# Patient Record
Sex: MALE | Race: WHITE | NOT HISPANIC OR LATINO
[De-identification: names, ages, dates, MRNs, and addresses within clinical notes are randomized per-mention and may not be internally consistent; named-entity substitution may affect disease eponyms.]

---

## 2018-02-26 PROBLEM — Z00.00 ENCOUNTER FOR PREVENTIVE HEALTH EXAMINATION: Status: ACTIVE | Noted: 2018-02-26

## 2018-03-09 DIAGNOSIS — M25.562 PAIN IN LEFT KNEE: ICD-10-CM

## 2018-03-23 ENCOUNTER — APPOINTMENT (OUTPATIENT)
Dept: ORTHOPEDIC SURGERY | Facility: CLINIC | Age: 68
End: 2018-03-23

## 2023-11-11 ENCOUNTER — APPOINTMENT (OUTPATIENT)
Dept: ORTHOPEDIC SURGERY | Facility: CLINIC | Age: 73
End: 2023-11-11
Payer: MEDICARE

## 2023-11-11 VITALS — BODY MASS INDEX: 32.89 KG/M2 | HEIGHT: 67.5 IN | WEIGHT: 212 LBS

## 2023-11-11 DIAGNOSIS — Z78.9 OTHER SPECIFIED HEALTH STATUS: ICD-10-CM

## 2023-11-11 DIAGNOSIS — J45.909 UNSPECIFIED ASTHMA, UNCOMPLICATED: ICD-10-CM

## 2023-11-11 PROCEDURE — 72040 X-RAY EXAM NECK SPINE 2-3 VW: CPT

## 2023-11-11 PROCEDURE — 99204 OFFICE O/P NEW MOD 45 MIN: CPT

## 2023-11-11 PROCEDURE — 73030 X-RAY EXAM OF SHOULDER: CPT | Mod: LT

## 2023-11-11 PROCEDURE — 73010 X-RAY EXAM OF SHOULDER BLADE: CPT | Mod: LT

## 2023-11-11 RX ORDER — METHYLPREDNISOLONE 4 MG/1
4 TABLET ORAL
Qty: 1 | Refills: 0 | Status: ACTIVE | COMMUNITY
Start: 2023-11-11 | End: 1900-01-01

## 2023-12-22 ENCOUNTER — APPOINTMENT (OUTPATIENT)
Dept: ORTHOPEDIC SURGERY | Facility: CLINIC | Age: 73
End: 2023-12-22
Payer: MEDICARE

## 2023-12-22 VITALS — WEIGHT: 212 LBS | HEIGHT: 67.5 IN | BODY MASS INDEX: 32.89 KG/M2

## 2023-12-22 PROCEDURE — 99214 OFFICE O/P EST MOD 30 MIN: CPT

## 2023-12-22 RX ORDER — CYCLOBENZAPRINE HYDROCHLORIDE 5 MG/1
5 TABLET, FILM COATED ORAL
Qty: 30 | Refills: 0 | Status: ACTIVE | COMMUNITY
Start: 2023-12-22 | End: 1900-01-01

## 2023-12-22 NOTE — REASON FOR VISIT
[FreeTextEntry2] : follow up left shoulder/neck. pain had improved with MDP and PT. Still notes persistent pain radiating down his arm.

## 2023-12-22 NOTE — IMAGING
[No bony abnormalities] : No bony abnormalities [Left] : left shoulder [There are no fractures, subluxations or dislocations. No significant abnormalities are seen] : There are no fractures, subluxations or dislocations. No significant abnormalities are seen [de-identified] :   ----------------------------------------------------------------------------   Cervical spine exam:   Inspection:        (neg) Abnormal alignment (kyphosis/lordosis)   (neg) Atrophy ROM:    Pain:               (+) Flexion/extension    (neg) Rotation    Stiffness:        (+) Flexion/extension    (neg) Rotation Tenderness:    Trapezial:       (neg) Right    (+mild) Left    (neg) Midline    Paraspinal:     (neg) Right    (neg) Left    Rhomboid :     (neg) Right    (neg) Left    Scapula:         (neg) Right   (neg) Left Strength:    Deltoid:          Right: 5/5   .  Left: 5/5    Biceps:          Right: 5/5   .  Left: 5/5    Triceps:         Right: 5/5   .  Left: 5/5    Wrist flex      Right: 5/5   .   Left: 5/5    Wrist ext:      Right: 5/5   .   Left: 5/5    Hand:            Right: 5/5   .   Left: 5/5 Neuro: DTR's wnl.  Sensation to light touch grossly in tact in all distributions.    (neg) Doug's    (neg) Spurling    (neg) Lhermitte Vascularity: Extremity warm and well perfused Gait: normal     ----------------------------------------------------------------------------   Left shoulder exam:    Skin: no significant pertinent finding  Inspection: no obvious deformity, no obvious masses, no swelling, no effusion, no atrophy  ROM:     FF: 180     ER: 70     IR: T12  Tenderness:     (neg) Anterior/Biceps:     (neg) Posterior     (neg) Lateral     (neg) Trapezius     (neg) Scapula     (neg) AC joint     (neg) Crepitus with ROM  Stability:     (neg) Translation     (neg) Apprehension     (neg) Clicking  Additional tests:     (mild) Neer's     (neg)Hawkin's     (neg) Wilson's     (neg) Speed     (neg) Cross chest adduction  Strength:     FF: 5/5     ER: 5/5     IR: 5/5     Biceps: 5/5     Triceps: 5/5     Distal: 5/5  Neuro: In tact to light touch throughout  Vascularity: Extremity warm and well perfused

## 2023-12-22 NOTE — HISTORY OF PRESENT ILLNESS
[Left Arm] : left arm [6] : 6 [Dull/Aching] : dull/aching [Tingling] : tingling [Nothing helps with pain getting better] : Nothing helps with pain getting better [de-identified] : This is Sandro PERCY RUFFIN a 73-year-old male who comes in today complaining of pain and tingling radiating down the arm, no injury.   on xarelto for afib [] : no [FreeTextEntry1] : Left shoulder [FreeTextEntry3] : 2 months ago  [de-identified] : activity  [de-identified] : physical therapy

## 2023-12-22 NOTE — DISCUSSION/SUMMARY
[Medication Risks Reviewed] : Medication risks reviewed [de-identified] : MRI to mir iglesias hnp flexeril patient is on xarelto fu p mri  ----------------------------------------------------------------------------  All relevant imaging studies pertinent to today's visit, including x-rays, MRI's and/or other advanced imaging studies (CT/etc) were independently interpreted and reviewed with the patient as needed. Implications of the studies together with the patient's clinical picture were discussed to formulate a working diagnosis and management options were detailed.  The patient was advised of the diagnosis.  The natural history of the pathology was explained in full. All questions were answered.  The risks and benefits of conservative and interventional treatment alternatives were explained to the patient   ----------------------------------------------------------------------------  The patient was advised that an advanced diagnostic/imaging study (MRI/CT/etc) will be ordered to evaluate potential pathology in the affected area(s).  The patient was further advised to follow up in the office to review the results of the study and determine further management that may be indicated.

## 2023-12-23 ENCOUNTER — APPOINTMENT (OUTPATIENT)
Dept: MRI IMAGING | Facility: CLINIC | Age: 73
End: 2023-12-23

## 2024-01-12 ENCOUNTER — APPOINTMENT (OUTPATIENT)
Dept: ORTHOPEDIC SURGERY | Facility: CLINIC | Age: 74
End: 2024-01-12
Payer: MEDICARE

## 2024-01-12 PROCEDURE — 99214 OFFICE O/P EST MOD 30 MIN: CPT

## 2024-01-12 RX ORDER — METHYLPREDNISOLONE 4 MG/1
4 TABLET ORAL
Qty: 1 | Refills: 0 | Status: ACTIVE | COMMUNITY
Start: 2024-01-12 | End: 1900-01-01

## 2024-01-12 NOTE — DATA REVIEWED
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

## 2024-01-12 NOTE — CONSULT LETTER
[Dear  ___] : Dear  [unfilled], [Consult Letter:] : I had the pleasure of evaluating your patient, [unfilled]. [Please see my note below.] : Please see my note below. [Consult Closing:] : Thank you very much for allowing me to participate in the care of this patient.  If you have any questions, please do not hesitate to contact me. [Sincerely,] : Sincerely, [FreeTextEntry3] : Harry Hickey

## 2024-01-12 NOTE — DISCUSSION/SUMMARY
Form signed and given to Fredy   [Medication Risks Reviewed] : Medication risks reviewed [de-identified] : Reviewed  c-spine MRI - discussed significant left c5-6 hnp consistent with his symptoms Plan for PT  Plan for MDP   Rec f/u with pain managment  adviesed fu with pmd regarding nodule of thyroid  ----------------------------------------------------------------------------  All relevant imaging studies pertinent to today's visit, including x-rays, MRI's and/or other advanced imaging studies (CT/etc) were independently interpreted and reviewed with the patient as needed. Implications of the studies together with the patient's clinical picture were discussed to formulate a working diagnosis and management options were detailed.  The patient was advised of the diagnosis.  The natural history of the pathology was explained in full. All questions were answered.  The risks and benefits of conservative and interventional treatment alternatives were explained to the patient   ----------------------------------------------------------------------------  Patient warned of specific risks of medication related to bleeding, GI issues, increase blood pressure, and cardiac risks in addition to additional risks.  Patient advised to discuss with PMD  if any presence of stated issues.

## 2024-01-12 NOTE — HISTORY OF PRESENT ILLNESS
[Left Arm] : left arm [6] : 6 [Dull/Aching] : dull/aching [Tingling] : tingling [Nothing helps with pain getting better] : Nothing helps with pain getting better [de-identified] : This is Sandro PERCY RUFFIN a 73-year-old male who comes in today complaining of pain and tingling radiating down the arm, no injury.   on xarelto for afib [] : no [FreeTextEntry1] : Left shoulder [FreeTextEntry3] : 2 months ago  [de-identified] : activity  [de-identified] : physical therapy/MRI

## 2024-01-12 NOTE — IMAGING
[No bony abnormalities] : No bony abnormalities [Left] : left shoulder [There are no fractures, subluxations or dislocations. No significant abnormalities are seen] : There are no fractures, subluxations or dislocations. No significant abnormalities are seen [de-identified] :   ----------------------------------------------------------------------------   Cervical spine exam:   Inspection:        (neg) Abnormal alignment (kyphosis/lordosis)   (neg) Atrophy ROM:    Pain:               (+) Flexion/extension    (neg) Rotation    Stiffness:        (+) Flexion/extension    (neg) Rotation Tenderness:    Trapezial:       (neg) Right    (+mild) Left    (neg) Midline    Paraspinal:     (neg) Right    (neg) Left    Rhomboid :     (neg) Right    (neg) Left    Scapula:         (neg) Right   (neg) Left Strength:    Deltoid:          Right: 5/5   .  Left: 5/5    Biceps:          Right: 5/5   .  Left: 5/5    Triceps:         Right: 5/5   .  Left: 5/5    Wrist flex      Right: 5/5   .   Left: 5/5    Wrist ext:      Right: 5/5   .   Left: 5/5    Hand:            Right: 5/5   .   Left: 5/5 Neuro: DTR's wnl.  Sensation to light touch grossly in tact in all distributions.    (neg) Doug's    (neg) Spurling    (neg) Lhermitte Vascularity: Extremity warm and well perfused Gait: normal     ----------------------------------------------------------------------------   Left shoulder exam:    Skin: no significant pertinent finding  Inspection: no obvious deformity, no obvious masses, no swelling, no effusion, no atrophy  ROM:     FF: 180     ER: 70     IR: T12  Tenderness:     (neg) Anterior/Biceps:     (neg) Posterior     (neg) Lateral     (neg) Trapezius     (neg) Scapula     (neg) AC joint     (neg) Crepitus with ROM  Stability:     (neg) Translation     (neg) Apprehension     (neg) Clicking  Additional tests:     (mild) Neer's     (neg)Hawkin's     (neg) Wilson's     (neg) Speed     (neg) Cross chest adduction  Strength:     FF: 5/5     ER: 5/5     IR: 5/5     Biceps: 5/5     Triceps: 5/5     Distal: 5/5  Neuro: In tact to light touch throughout  Vascularity: Extremity warm and well perfused

## 2024-01-19 ENCOUNTER — APPOINTMENT (OUTPATIENT)
Dept: PAIN MANAGEMENT | Facility: CLINIC | Age: 74
End: 2024-01-19
Payer: MEDICARE

## 2024-01-19 VITALS — WEIGHT: 211 LBS | HEIGHT: 67 IN | BODY MASS INDEX: 33.12 KG/M2

## 2024-01-19 PROCEDURE — 99214 OFFICE O/P EST MOD 30 MIN: CPT

## 2024-01-19 NOTE — HISTORY OF PRESENT ILLNESS
[Neck] : neck [7] : 7 [4] : 4 [Dull/Aching] : dull/aching [Radiating] : radiating [Sharp] : sharp [Intermittent] : intermittent [Household chores] : household chores [Leisure] : leisure [Sleep] : sleep [Meds] : meds [Bending forward] : bending forward [FreeTextEntry1] : Initial HPI 01/19/2024: Pain started October 2023 and is on the left side of the neck and radiates to the left shoulder and down the left arm to the elbow described as an achy pain with occasional tingling in the forearm and some pain in the thumb,   MRI Cervical Spine 1/6/24 independently reviewed: C3-4, C5-6, C5-6 HNPs with C4-5, C5-6 cord abutment. C5-6 left C6 abutment  aware of thyroid nodule.  Conservative Care: has started PT but had to stop bc of Covid  Pain Medications: Tylenol PRN; completed MDP x2 with temporary relief Past Injections: none Spine surgery: none  Blood thinners: XARELTO  [] : no [FreeTextEntry7] : LT SHOULDER [de-identified] : MRI

## 2024-01-19 NOTE — REASON FOR VISIT
[Initial Consultation] : an initial pain management consultation [FreeTextEntry2] :  referred pt for neck pain

## 2024-01-19 NOTE — DISCUSSION/SUMMARY
[de-identified] : After discussing various treatment options with the patient including but not limited to oral medications, physical therapy, exercise modalities as well as interventional spinal injections, we have decided with the following plan:  - Continue Home exercises, stretching, activity modification, physical therapy, and conservative care. - MRI report and/or images was reviewed and discussed with the patient. - Recommend C7-T1 Cervical Epidural Steroid Injection under fluoroscopic guidance with image. - The risks, benefits and alternatives of the proposed procedure were explained in detail with the patient. The risks outlined include but are not limited to infection, bleeding, post-dural puncture headache, nerve injury, a temporary increase in pain, failure to resolve symptoms, allergic reaction, symptom recurrence, and possible elevation of blood sugar in diabetics. All questions were answered to patient's apparent satisfaction and he/she verbalized an understanding. - Patient is presenting with acute/sub-acute radicular pain with impairment in ADLs and functionality.  The pain has not responded to conservative care including NSAID therapy and/or physical therapy.  There is no bleeding tendency, unstable medical condition, or systemic infection. - Follow up in 1-2 weeks post injection for re-evaluation. - Patient is on anticoagulation therapy and needs medical clearance to stop medication for the procedure. (XARELTO)

## 2024-01-19 NOTE — PHYSICAL EXAM
[de-identified] : Constitutional; Appears well, no apparent distress Ability to communicate: Normal  Respiratory: non-labored breathing Skin: No rash noted Head: Normocephalic, atraumatic Neck: no visible thyroid enlargement Eyes: Extraocular movements intact Neurologic: Alert and oriented x3 Psychiatric: normal mood, affect and behavior [] : negative Spurling

## 2024-02-14 ENCOUNTER — APPOINTMENT (OUTPATIENT)
Age: 74
End: 2024-02-14
Payer: MEDICARE

## 2024-02-14 PROCEDURE — 62321 NJX INTERLAMINAR CRV/THRC: CPT

## 2024-03-01 ENCOUNTER — APPOINTMENT (OUTPATIENT)
Dept: PAIN MANAGEMENT | Facility: CLINIC | Age: 74
End: 2024-03-01
Payer: MEDICARE

## 2024-03-01 VITALS — HEIGHT: 67 IN | WEIGHT: 220 LBS | BODY MASS INDEX: 34.53 KG/M2

## 2024-03-01 PROCEDURE — 99214 OFFICE O/P EST MOD 30 MIN: CPT

## 2024-03-01 NOTE — DISCUSSION/SUMMARY
[de-identified] : After discussing various treatment options with the patient including but not limited to oral medications, physical therapy, exercise modalities as well as interventional spinal injections, we have decided with the following plan:  - Continue Home exercises, stretching, activity modification, physical therapy, and conservative care. - MRI report and/or images was reviewed and discussed with the patient. - Recommend C7-T1 Cervical Epidural Steroid Injection under fluoroscopic guidance with image. - The risks, benefits and alternatives of the proposed procedure were explained in detail with the patient. The risks outlined include but are not limited to infection, bleeding, post-dural puncture headache, nerve injury, a temporary increase in pain, failure to resolve symptoms, allergic reaction, symptom recurrence, and possible elevation of blood sugar in diabetics. All questions were answered to patient's apparent satisfaction and he/she verbalized an understanding. - Patient is presenting with acute/sub-acute radicular pain with impairment in ADLs and functionality.  The pain has not responded to conservative care including NSAID therapy and/or physical therapy.  There is no bleeding tendency, unstable medical condition, or systemic infection. - Follow up in 1-2 weeks post injection for re-evaluation. - Patient is on anticoagulation therapy and needs medical clearance to stop medication for the procedure. (XARELTO)   - Recommend Gabapentin 300mg QHS. Pt instructed not to drive or operate heavy machinery while on medications.

## 2024-03-01 NOTE — PHYSICAL EXAM
[de-identified] : Constitutional; Appears well, no apparent distress Ability to communicate: Normal  Respiratory: non-labored breathing Skin: No rash noted Head: Normocephalic, atraumatic Neck: no visible thyroid enlargement Eyes: Extraocular movements intact Neurologic: Alert and oriented x3 Psychiatric: normal mood, affect and behavior [] : negative Spurling

## 2024-03-01 NOTE — HISTORY OF PRESENT ILLNESS
[Neck] : neck [7] : 7 [4] : 4 [Dull/Aching] : dull/aching [Radiating] : radiating [Sharp] : sharp [Intermittent] : intermittent [Household chores] : household chores [Leisure] : leisure [Sleep] : sleep [Meds] : meds [Bending forward] : bending forward [FreeTextEntry1] : 03/01/2024: s/p C7-T1 DB on with 50% relief and improvement of ADLs. Pain was great for a few days and then started to return. Continues to do PT.   Initial HPI 01/19/2024: Pain started October 2023 and is on the left side of the neck and radiates to the left shoulder and down the left arm to the elbow described as an achy pain with occasional tingling in the forearm and some pain in the thumb,   MRI Cervical Spine 1/6/24 independently reviewed: C3-4, C5-6, C5-6 HNPs with C4-5, C5-6 cord abutment. C5-6 left C6 abutment  aware of thyroid nodule.  Conservative Care: has started PT but had to stop bc of Covid  Pain Medications: Tylenol PRN; completed MDP x2 with temporary relief Past Injections: none Spine surgery: none  Blood thinners: XARELTO  [] : no [FreeTextEntry7] : LT SHOULDER [de-identified] : MRI [TWNoteComboBox1] : 50%

## 2024-03-25 ENCOUNTER — RX RENEWAL (OUTPATIENT)
Age: 74
End: 2024-03-25

## 2024-03-25 RX ORDER — GABAPENTIN 300 MG/1
300 CAPSULE ORAL
Qty: 30 | Refills: 0 | Status: ACTIVE | COMMUNITY
Start: 2024-03-01 | End: 1900-01-01

## 2024-04-03 ENCOUNTER — APPOINTMENT (OUTPATIENT)
Age: 74
End: 2024-04-03
Payer: MEDICARE

## 2024-04-03 PROCEDURE — 62321 NJX INTERLAMINAR CRV/THRC: CPT

## 2024-04-19 ENCOUNTER — APPOINTMENT (OUTPATIENT)
Dept: PAIN MANAGEMENT | Facility: CLINIC | Age: 74
End: 2024-04-19
Payer: MEDICARE

## 2024-04-19 VITALS — HEIGHT: 67 IN | BODY MASS INDEX: 34.84 KG/M2 | WEIGHT: 222 LBS

## 2024-04-19 DIAGNOSIS — M50.20 OTHER CERVICAL DISC DISPLACEMENT, UNSPECIFIED CERVICAL REGION: ICD-10-CM

## 2024-04-19 DIAGNOSIS — M54.12 RADICULOPATHY, CERVICAL REGION: ICD-10-CM

## 2024-04-19 PROCEDURE — 99214 OFFICE O/P EST MOD 30 MIN: CPT

## 2024-04-19 RX ORDER — METHYLPREDNISOLONE 4 MG/1
4 TABLET ORAL
Qty: 1 | Refills: 0 | Status: ACTIVE | COMMUNITY
Start: 2024-04-19 | End: 1900-01-01

## 2024-04-19 RX ORDER — GABAPENTIN 300 MG/1
300 CAPSULE ORAL
Qty: 60 | Refills: 0 | Status: ACTIVE | COMMUNITY
Start: 2024-04-19 | End: 1900-01-01

## 2024-10-24 ENCOUNTER — APPOINTMENT (OUTPATIENT)
Dept: MRI IMAGING | Facility: CLINIC | Age: 74
End: 2024-10-24

## 2024-10-24 ENCOUNTER — APPOINTMENT (OUTPATIENT)
Dept: ORTHOPEDIC SURGERY | Facility: CLINIC | Age: 74
End: 2024-10-24
Payer: MEDICARE

## 2024-10-24 VITALS — HEIGHT: 67 IN | WEIGHT: 222 LBS | BODY MASS INDEX: 34.84 KG/M2

## 2024-10-24 PROCEDURE — 99213 OFFICE O/P EST LOW 20 MIN: CPT

## 2024-10-24 PROCEDURE — 73221 MRI JOINT UPR EXTREM W/O DYE: CPT | Mod: LT,MH

## 2024-10-24 RX ORDER — FENOFIBRATE 150 MG/1
150 CAPSULE ORAL
Refills: 0 | Status: ACTIVE | COMMUNITY

## 2024-10-24 RX ORDER — MONTELUKAST SODIUM 4 MG/1
4 GRANULE ORAL
Refills: 0 | Status: ACTIVE | COMMUNITY

## 2024-10-24 RX ORDER — RIVAROXABAN 2.5 MG/1
2.5 TABLET, FILM COATED ORAL
Refills: 0 | Status: ACTIVE | COMMUNITY

## 2024-10-24 RX ORDER — ATORVASTATIN CALCIUM 80 MG/1
80 TABLET, FILM COATED ORAL
Refills: 0 | Status: ACTIVE | COMMUNITY

## 2024-10-24 RX ORDER — EZETIMIBE 10 MG/1
TABLET ORAL
Refills: 0 | Status: ACTIVE | COMMUNITY

## 2024-10-24 RX ORDER — METOPROLOL TARTRATE 1 MG/ML
INJECTION, SOLUTION INTRAVENOUS
Refills: 0 | Status: ACTIVE | COMMUNITY

## 2024-11-01 ENCOUNTER — APPOINTMENT (OUTPATIENT)
Dept: ORTHOPEDIC SURGERY | Facility: CLINIC | Age: 74
End: 2024-11-01
Payer: MEDICARE

## 2024-11-01 VITALS — WEIGHT: 222 LBS | BODY MASS INDEX: 34.84 KG/M2 | HEIGHT: 67 IN

## 2024-11-01 DIAGNOSIS — S53.402A UNSPECIFIED SPRAIN OF LEFT ELBOW, INITIAL ENCOUNTER: ICD-10-CM

## 2024-11-01 PROCEDURE — 99213 OFFICE O/P EST LOW 20 MIN: CPT

## 2025-03-20 ENCOUNTER — APPOINTMENT (OUTPATIENT)
Dept: ORTHOPEDIC SURGERY | Facility: CLINIC | Age: 75
End: 2025-03-20
Payer: MEDICARE

## 2025-03-20 DIAGNOSIS — M16.12 UNILATERAL PRIMARY OSTEOARTHRITIS, LEFT HIP: ICD-10-CM

## 2025-03-20 DIAGNOSIS — M76.32 ILIOTIBIAL BAND SYNDROME, LEFT LEG: ICD-10-CM

## 2025-03-20 PROCEDURE — 99214 OFFICE O/P EST MOD 30 MIN: CPT

## 2025-03-20 RX ORDER — METHYLPREDNISOLONE 4 MG/1
4 TABLET ORAL
Qty: 1 | Refills: 1 | Status: ACTIVE | COMMUNITY
Start: 2025-03-20 | End: 1900-01-01